# Patient Record
Sex: FEMALE | Race: WHITE | Employment: STUDENT | URBAN - METROPOLITAN AREA
[De-identification: names, ages, dates, MRNs, and addresses within clinical notes are randomized per-mention and may not be internally consistent; named-entity substitution may affect disease eponyms.]

---
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
## 2023-03-29 NOTE — LETTER
March 29, 2023      [facility]  [address]  [facility] [address]  Phone: [phone]  Fax: [phone]       Patient: Garry Trent   YOB: 2013  Date of Visit: 03/29/2023    To Whom It May Concern:    Esperanza Trent  was at [facility] on 03/29/2023. The patient may return to school on 03/29/2023. If you have any questions or concerns, or if I can be of further assistance, please do not hesitate to contact me.    Sincerely,    Cecilia Larios MA      4/14/2021              [address]        [facility] [address]         To Whom It May Concern,    Please excuse Olimpia Lim from any missed work or school today as she was seen in our office and needed some testing done.

## (undated) NOTE — LETTER
11/2/2017              Kaitlyn Reese [address] [facility] [address]         To Whom It May Concern,    Verner Neu can return to gym tomorrow but should only do walking, no running or jumping for the next 3 weeks.  She can return t

## (undated) NOTE — LETTER
Date & Time: 11/21/2019, 5:55 PM  Patient: Jn Travis  Encounter Provider(s):    Sherley Mccollum MD       To Whom It May Concern:    Garry Desir was seen and treated in our department on 11/21/2019.  She should not return to school until

## (undated) NOTE — LETTER
VACCINE ADMINISTRATION RECORD  PARENT / GUARDIAN APPROVAL  Date: 2019  Vaccine administered to: Liya Ramos     : 10/1/2005    MRN: [id number]    A copy of the appropriate Centers for Disease Control and Prevention Vaccine Information state

## (undated) NOTE — LETTER
2/19/2020              [address] [place] [id number]         To Whom it may concern:     This is to certify that Tameka Velazquez had an appointment on 2/18/2020 with Olivia Prince MD.  Please excuse her f

## (undated) NOTE — LETTER
2/19/2019              Ralph Herring [address] [facility] [address]         To whom it may concern,       Please allow Lizeth Cano to have a water bottle during class, and allow to use the restroom as needed.      Si

## (undated) NOTE — LETTER
1/29/2018              [address] [place] [id number]         To Whom It May Concern,    Please excuse Ralph Monterroso from school 1/26 and 1/29 due to a viral illness.  She may miss more days of school this week due to t

## (undated) NOTE — ED AVS SNAPSHOT
Alanna Cross   MRN: [id number]    Department:  [facility]   Date of Visit:  9/24/2019           Disclosure     Insurance plans vary and the physician(s) referred by the ER may not be covered by your plan.  Please conta CARE PHYSICIAN AT ONCE OR RETURN IMMEDIATELY TO THE EMERGENCY DEPARTMENT. If you have been prescribed any medication(s), please fill your prescription right away and begin taking the medication(s) as directed.   If you believe that any of the medications

## (undated) NOTE — Clinical Note
2/2/2017          To Whom It May Concern:    Guera Johansen is currently under my medical care. Please excuse Lissett Moreno from gym at this time. Lissett Moreno has a follow up office visit on 2/7/17    Her return to gym will be decided upon at this time.      If

## (undated) NOTE — Clinical Note
2/21/2017          To Whom It May Concern:    Guera Johansen is currently under my medical care and may return to gym and volleyball on Monday 2/27/2017 as her permitted by her knee pain.     If you require additional information please contact our offi

## (undated) NOTE — MR AVS SNAPSHOT
Jan  [address]  [phone]               Thank you for choosing us for your health care visit with Mikel Fountain MD.  We are glad to serve you and happy to provide you with this solares

## (undated) NOTE — MR AVS SNAPSHOT
Jan  [address]  [phone]               Thank you for choosing us for your health care visit with Kin Senior MD.  We are glad to serve you and happy to provide you with this solares [facility] [facility])  [address] [facility] Maddie Carrero, [address] [facility]  [address], [address] (MRI Only)  [address]    It is the patient's responsibility to

## (undated) NOTE — Clinical Note
2/7/2017              Bernice Chavira [address] [place] [address]         To whom it may concern:    Yordy Herndon is under my medical care. Yordy Herndon will be having an MRI.     Yordy Garcialoan will not be able to participate in gym until MRI

## (undated) NOTE — LETTER
[facility] [employer] of Child Health Examination       Student's Name  Galindo Herndon Signature                                                                                                                                     Title                           Date  11/27/2019   Signature 10/1/2005  Sex  Female School   Grade Level/ID#  10th Grade   HEALTH HISTORY          TO BE COMPLETED AND SIGNED BY PARENT/GUARDIAN AND VERIFIED BY HEALTH CARE PROVIDER    ALLERGIES  (Food, drug, insect, other)  Patient has no known allergies.  MEDICATION Bone/Joint problem/injury/scoliosis?    Yes   No  Parent/Guardian Signature                                          Date     PHYSICAL EXAMINATION REQUIREMENTS    Entire section below to be completed by MD/DO/APN/PA       PHYSICAL EXAMINATION REQUIREMENTS ( Ears Yes                      Screen result: Gastrointestinal Yes    Eyes Yes     Screen result:   Genito-Urinary Yes  LMP   Nose Yes  Neurological Yes    Throat Yes  Musculoskeletal Yes    Mouth/Dental Yes  Spinal examination Yes    Cardiovascular/HTN Yes Rev 11/15                                                                    Printed by the [employer]

## (undated) NOTE — MR AVS SNAPSHOT
Jan  [address]  [phone]               Thank you for choosing us for your health care visit with Alona Cockayne, MD.  We are glad to serve you and happy to provide you with this solares Diagnoses:  Lumbar strain, initial encounter   Patella, chondromalacia, right   Order:  Physical Therapy - Internal        Comment:  2 times a week for 4 weeks    Focus on quad, hamstring, and IT band stretching/strengthening, core and pelvic stabilizatio between 7:30am to 6pm and on Saturday between 8am and 1pm. Evening and weekend appointments for your exam are available. Walk-in patients are welcome for most exams.      [facility] and [address] [place] [address] [address]  [address] [facility] [address]  [address], [address] [facility] [address], [address]    [facility]  [address], [address] [place] [address]        2 times a week for 4 weeks o 2 or less hours of screen time a day  o 1 or more hours of physical activity a day    To help children live healthy active lives, parents can:  o Be role models themselves by making healthy eating and daily physical activity the norm for their family.   o

## (undated) NOTE — LETTER
[facility] [employer] Child Health Examination       Student's Name  Aracely Loza Signature                                                                                                                                   Title                           Date     Signature Female School   Grade Level/ID#  9th Grade   HEALTH HISTORY          TO BE COMPLETED AND SIGNED BY PARENT/GUARDIAN AND VERIFIED BY HEALTH CARE PROVIDER    ALLERGIES  (Food, drug, insect, other) MEDICATION  (List all prescribed or taken on a regular basis. ) /70   Ht 5' 1.5\" (1.562 m)   Wt 53.9 kg (118 lb 12.8 oz)   BMI 22.08 kg/m²     DIABETES SCREENING  BMI>85% age/sex  No And any two of the following:  Family History No   Ethnic Minority  No          Signs of Insulin Resistance (hypertension, dyslipi Currently Prescribed Asthma Medication:            Quick-relief  medication (e.g. Short Acting Beta Antagonist): No          Controller medication (e.g. inhaled corticosteroid):   No Other   NEEDS/MODIFICATIONS required in the school setting  None DIET

## (undated) NOTE — Clinical Note
1/20/2017          To Whom It May Concern:    Allison Urrutia is currently under my medical care. Please excuse her from gym for 2 weeks. If you require additional information please contact our office.         Sincerely,    Emery Kim MD

## (undated) NOTE — LETTER
Date & Time: 5/10/2021, 2:01 PM  Patient: Elieser Dean  Encounter Provider(s):    CRISTINA Garcia       To Whom It May Concern:    Justin Brewer was seen and treated in our department on 5/10/2021. Please excuse from school today.  She can r

## (undated) NOTE — LETTER
Date & Time: 1/26/2019, 2:50 PM  Patient: Iris Alt  Encounter Provider(s):    Mello Gregorio MD       To Whom It May Concern:    Michelle Scott was seen and treated in our department on 1/26/2019.  She should not return to school until 1/28

## (undated) NOTE — ED AVS SNAPSHOT
Parent/Legal Guardian Access to the Online [employer] Record of a Patient 15to 16Years Old  Return completed form by Secure email to [facility] Records Department: [email or web address].     Requirements and Procedures   Under [facility] MyChart ID and password with another person, that person may be able to view my or my child’s health information, and health information about someone who has authorized me as a MyChart proxy.    ·  I agree that it is my responsibility to select a confident Sign-Up Form and I agree to its terms.        Authorization Form     Please enter Patient’s information below:   Name (last, first, middle initial) __________________________________________   Gender  Male  Female    Last 4 Digits of Social Security Number Parent/Legal Guardian Signature                                  For Patient (1517 years of age)  I agree to allow my parent/legal guardian, named above, online access to my medical information currently available and that may become available as a result

## (undated) NOTE — LETTER
2020              Gustavo Pettit                                                                                : 10/01/2005        [facility]*         Immunization History   Administered Date(s) Senthil Corona

## (undated) NOTE — LETTER
Name:  Patricia Sep School Year:  9th Grade Class: Student ID No.:   Address:  [address] [place] [address] Phone:  [phone] (home) [phone] (work) :  13year old   Name Relationship Lgl 69 Nathan Lawrence implanted defibrillator? 12. Has anyone in your family had unexplained fainting, seizures, or near drowning?      BONE AND JOINT QUESTIONS Yes No   17. Have you ever had an injury to a bone, muscle, ligament, or tendon that caused you to miss a practice 39.Have you ever been unable to move your arms / legs after being hit /fall? 40. Have you ever become ill while exercising in the heat?     41. Do you get frequent muscle cramps when exercising? 42.  Do you or someone in your family have sickle cell · Location of point of maximal impulse (PMI) Yes    Pulses Yes    Lungs Yes    Abdomen Yes    Genitourinary (males only)* N/A    Skin:  HSV, lesions suggestive of MRSA, tinea corporis Yes    Neurologic* Yes    MUSCULOSKELETAL     Neck Yes    Back Yes    Sh performance-enhancing substances in my/his/her body either during [employer] state [employer] events or during the school day, and I/our student do/does hereby agree to submit to such testing and analysis by a certified laboratory.  We further understand and agree th

## (undated) NOTE — LETTER
October 17, 2017    Patient: Paola Gist   Date of Visit: 10/17/2017       To Whom It May Concern:    Jefry Jackson was seen and treated in our emergency department on 10/17/2017.  She should be excused from PE and sports for until cleared by

## (undated) NOTE — LETTER
4/24/2019              [address] [place] [id number]*         To Whom it may concern: This is to certify that Guera Johansen had an appointment on 4/24/2019 with Ashwini Ruiz DO.   Please excuse her f

## (undated) NOTE — LETTER
9/3/2019              [address] [place] [id number]*         Please excuse from volleyball today and last week due to doctor appointments. She can return to volleyball tomorrow.       Sincerely,      Yahir Lemus

## (undated) NOTE — ED AVS SNAPSHOT
Jaymie Bustamante   MRN: [id number]    Department:  [facility] Emergency Department   Date of Visit:  10/17/2017           Disclosure     Insurance plans vary and the physician(s) referred by the ER may not be covered by your plan.  Please cont CARE PHYSICIAN AT ONCE OR RETURN IMMEDIATELY TO THE EMERGENCY DEPARTMENT. If you have been prescribed any medication(s), please fill your prescription right away and begin taking the medication(s) as directed.   If you believe that any of the medications